# Patient Record
Sex: MALE | Race: OTHER | HISPANIC OR LATINO | URBAN - METROPOLITAN AREA
[De-identification: names, ages, dates, MRNs, and addresses within clinical notes are randomized per-mention and may not be internally consistent; named-entity substitution may affect disease eponyms.]

---

## 2023-11-24 ENCOUNTER — EMERGENCY (EMERGENCY)
Facility: HOSPITAL | Age: 33
LOS: 1 days | Discharge: HOME | End: 2023-11-24
Attending: EMERGENCY MEDICINE
Payer: COMMERCIAL

## 2023-11-24 VITALS
HEART RATE: 64 BPM | WEIGHT: 175.71 LBS | TEMPERATURE: 98 F | DIASTOLIC BLOOD PRESSURE: 86 MMHG | OXYGEN SATURATION: 99 % | SYSTOLIC BLOOD PRESSURE: 127 MMHG | RESPIRATION RATE: 18 BRPM

## 2023-11-24 PROCEDURE — 99283 EMERGENCY DEPT VISIT LOW MDM: CPT

## 2023-11-24 PROCEDURE — 99284 EMERGENCY DEPT VISIT MOD MDM: CPT

## 2023-11-24 PROCEDURE — 73630 X-RAY EXAM OF FOOT: CPT | Mod: 26,LT

## 2023-11-24 PROCEDURE — 73630 X-RAY EXAM OF FOOT: CPT

## 2023-11-24 NOTE — ED PROVIDER NOTE - CARE PROVIDER_API CALL
Alejandro Langford  Podiatric Medicine and Surgery  [address] [place] [id number]  Phone: [phone]  Fax: [phone]  Follow Up Time:

## 2023-11-24 NOTE — ED PROVIDER NOTE - NS ED ROS FT
Gen: denies fever, chills, fatigue, weight loss  Skin: denies rashes, laceration, bruising  HEENT: denies visual changes, ear pain, nasal congestion, throat pain  MSK: +L foot pain & swelling.   Neuro: denies headache, dizziness, weakness, numbness

## 2023-11-24 NOTE — ED PROVIDER NOTE - CLINICAL SUMMARY MEDICAL DECISION MAKING FREE TEXT BOX
34yo M presenting with atraumatic left foot pain and swelling x 4 days. no evidence of wound or cellulitis. No trauma or known injury. Will obtain XR as pt does not have results of XR at [facility]. took ibuprofen prior to arrival 32yo M presenting with atraumatic left foot pain and swelling x 4 days. no evidence of wound or cellulitis. No trauma or known injury. Will obtain XR as pt does not have results of XR at [facility]. took ibuprofen prior to arrival. XR without acute findings, no fx noted. patient informed they will be contacted if there is any discrepancy present on final radiology report. provided f/u information for podiatry. pt already has fracture shoe on.

## 2023-11-24 NOTE — ED PROVIDER NOTE - ATTENDING APP SHARED VISIT CONTRIBUTION OF CARE
I agree with the PA's note and was available for any issues/concerns. I was directly involved in patient care. My brief overall assessment is as follows:     Atraumatic left foot pain worsening over the last 5 days walking in a hard soled shoe.  No fevers no chills sometimes he feels like the whole foot is swelling especially at the end of the day.  On exam he has normal DP and PT.  Swelling noted over the MTP joint of the third fourth and fifth digits of the left foot.  No erythema fluctuance.  No tenderness to the base of the foot.  Does have a flatfoot.  X-ray reviewed no abnormalities possible soft tissue injury recommend if using the flat soled shoe to add arch support and follow-up with podiatry

## 2023-11-24 NOTE — ED PROVIDER NOTE - PATIENT PORTAL LINK FT
You can access the FollowMyHealth Patient Portal offered by [facility] by registering at the following website: [email or web address]. By joining [facility]’s FollowMyHealth portal, you will also be able to view your health information using other applications (apps) compatible with our system.

## 2023-11-24 NOTE — ED PROVIDER NOTE - NSFOLLOWUPINSTRUCTIONS_ED_ALL_ED_FT
- May apply ice to affected areas 10-15 minutes at a time, multiple times per day. You may use as frequently as desired.  - May take ibuprofen 600mg every 6 hours as needed for pain control  - Elevate extremity while resting   - Rest affected area, avoid heavy lifting, exertion  - Follow-up with podiatrist provided within 1-2 weeks for further evaluation if pain persists

## 2023-11-24 NOTE — ED PROVIDER NOTE - OBJECTIVE STATEMENT
32yo M no PMHx Presents to ED C/O pain and swelling to dorsum of left foot x 4 days.  Patient states he started having pain to dorsum of left foot proximal to third and fourth toes on Monday.  States pain has persisted during the week as developed swelling to same area so went to urgent care on Wednesday had an x-ray which he was told was negative.  States he has been taking ibuprofen which helps for a little while.  States yesterday it was significant more painful than it has been so today came to ED.  Denies fall/trauma, skin puncture, wound, warmth, linear streaking, erythema, numbness, tingling, weakness, calf pain/swelling, recent travel, hx gout. Has been wearing hard soled fx shoe.

## 2024-01-31 ENCOUNTER — NON-APPOINTMENT (OUTPATIENT)
Age: 34
End: 2024-01-31